# Patient Record
Sex: MALE | Race: WHITE
[De-identification: names, ages, dates, MRNs, and addresses within clinical notes are randomized per-mention and may not be internally consistent; named-entity substitution may affect disease eponyms.]

---

## 2018-12-26 ENCOUNTER — HOSPITAL ENCOUNTER (EMERGENCY)
Dept: HOSPITAL 65 - ER | Age: 63
Discharge: OTHER INSTITUTION | End: 2018-12-26
Payer: COMMERCIAL

## 2018-12-26 VITALS — BODY MASS INDEX: 21.82 KG/M2 | HEIGHT: 74 IN | WEIGHT: 170 LBS

## 2018-12-26 VITALS — DIASTOLIC BLOOD PRESSURE: 80 MMHG | SYSTOLIC BLOOD PRESSURE: 127 MMHG

## 2018-12-26 VITALS — SYSTOLIC BLOOD PRESSURE: 120 MMHG | DIASTOLIC BLOOD PRESSURE: 62 MMHG

## 2018-12-26 VITALS — SYSTOLIC BLOOD PRESSURE: 127 MMHG | DIASTOLIC BLOOD PRESSURE: 80 MMHG

## 2018-12-26 VITALS — DIASTOLIC BLOOD PRESSURE: 81 MMHG | SYSTOLIC BLOOD PRESSURE: 121 MMHG

## 2018-12-26 DIAGNOSIS — Z88.0: ICD-10-CM

## 2018-12-26 DIAGNOSIS — F17.210: ICD-10-CM

## 2018-12-26 DIAGNOSIS — R05: ICD-10-CM

## 2018-12-26 DIAGNOSIS — R06.02: Primary | ICD-10-CM

## 2018-12-26 LAB
ALP INTEST CFR SERPL: 104 U/L (ref 50–136)
ALT SERPL-CCNC: 53 U/L (ref 12–78)
AST SERPL-CCNC: 34 U/L (ref 0–35)
BASE EXCESS BLDA CALC-SCNC: -1.7 MMOL/L (ref -2–2)
BASOPHILS # BLD AUTO: 0 10^3/UL (ref 0–0.1)
BASOPHILS NFR BLD AUTO: 0.1 % (ref 0–0.2)
CALCIUM SERPL-MCNC: 8.8 MG/DL (ref 8.4–10.5)
CO2 BLDA-SCNC: 25.4 MMOL/L (ref 20–32)
EOSINOPHIL # BLD AUTO: 0 10^3/UL (ref 0–0.2)
EOSINOPHIL NFR BLD AUTO: 0 % (ref 0–5)
ERYTHROCYTE [DISTWIDTH] IN BLOOD BY AUTOMATED COUNT: 24.1 % (ref 11.5–14.5)
GLUCOSE PRE 100 G GLC PO SERPL-MCNC: 128 MG/DL (ref 70–110)
HCO3 BLDA-SCNC: 20.2 MMOL/L (ref 22–26)
HGB BLD-MCNC: 14.1 G/DL (ref 13.9–16.3)
LYMPHOCYTES # BLD AUTO: 0.8 10^3/UL (ref 1–4.8)
LYMPHOCYTES NFR BLD AUTO: 4.9 % (ref 24–44)
LYMPHOCYTES NFR BLD: 3 % (ref 25–36)
MANUAL DIF COMMENT BLD-IMP: NORMAL
MCH RBC QN AUTO: 28.8 PG (ref 26–34)
MCHC RBC AUTO-ENTMCNC: 31.5 G/DL (ref 33–37)
MCV RBC AUTO: 91.4 FL (ref 78–100)
MONOCYTES # BLD AUTO: 0.6 10^3/UL (ref 0.3–0.8)
MONOCYTES NFR BLD AUTO: 3.6 % (ref 5–12)
MONOCYTES NFR BLD: 2 % (ref 3–9)
NEUTROPHILS # BLD AUTO: 14.8 10^3/UL (ref 1.8–7.7)
NEUTROPHILS NFR BLD AUTO: 86.9 % (ref 41–85)
NEUTS SEG NFR BLD: 95 % (ref 31–76)
PCO2 BLDA: 27.6 MMHG (ref 35–45)
PH BLDA: 7.48 [PH] (ref 7.35–7.45)
PLATELET # BLD AUTO: 53 10^3/UL (ref 150–400)
WBC # BLD AUTO: 17 10^3/UL (ref 4.5–11)

## 2018-12-26 PROCEDURE — 96365 THER/PROPH/DIAG IV INF INIT: CPT

## 2018-12-26 PROCEDURE — 94640 AIRWAY INHALATION TREATMENT: CPT

## 2018-12-26 PROCEDURE — 99285 EMERGENCY DEPT VISIT HI MDM: CPT

## 2018-12-26 PROCEDURE — 85379 FIBRIN DEGRADATION QUANT: CPT

## 2018-12-26 PROCEDURE — 80053 COMPREHEN METABOLIC PANEL: CPT

## 2018-12-26 PROCEDURE — 71045 X-RAY EXAM CHEST 1 VIEW: CPT

## 2018-12-26 PROCEDURE — 36600 WITHDRAWAL OF ARTERIAL BLOOD: CPT

## 2018-12-26 PROCEDURE — 93005 ELECTROCARDIOGRAM TRACING: CPT

## 2018-12-26 PROCEDURE — 36415 COLL VENOUS BLD VENIPUNCTURE: CPT

## 2018-12-26 PROCEDURE — 96375 TX/PRO/DX INJ NEW DRUG ADDON: CPT

## 2018-12-26 PROCEDURE — 85025 COMPLETE CBC W/AUTO DIFF WBC: CPT

## 2018-12-26 PROCEDURE — 82803 BLOOD GASES ANY COMBINATION: CPT

## 2018-12-26 NOTE — ER.PDOC
General


Chief Complaint:  General Complaint


Stated Complaint:  POSSIBLE STROKE


Time seen by MD:  19:02


Source:  patient, EMS


Exam Limitations:  clinical condition





History of Present Illness


Initial Comments


pt awake alert but wont talk,sob x 8 days


Severity:  mild, moderate


Activities at Onset:  activity/exertion


Prior Episodes/Possible Cause:  unknown cause


Associated Symptoms:  denies symptoms


Prior symptoms/Treatment:  Similar symptoms previous


Allergies:  


Coded Allergies:  


     Penicillins (Verified  Allergy, Unknown, Shortness of Breath, 12/26/18)





Past Medical History


Medical History:  COPD


Surgical History:  no surgical history





Family History


Significant Family History:  no pertinent family hx





Social History


Smoking:  cigarettes





Review of Systems


Constitutional:  no symptoms reported


EENTM:  no symptoms reported


Respiratory:  no symptoms reported, see HPI, cough, shortness of breath


Cardiovascular:  no symptoms reported


Gastrointestinal:  no symptoms reported


Musculoskeletal:  no symptoms reported


Skin:  no symptoms reported


Psychiatric/Neurological:  no symptoms reported


Endocrine:  no symptoms reported


All Other Systems:  Reviewed and Negative





Physical Exam


General Appearance:  No Apparent Distress, WD/WN


HEENT:  PERRL/EOMI, Normal ENT Inspection


Neck:  Non-Tender


Respiratory:  chest non-tender, lungs clear, normal breath sounds, no 

respiratory distress


Cardiovascular:  Normal Peripheral Pulses


Gastrointestinal:  Normal Bowel Sounds


Extremities:  Normal Range of Motion


Neurologic/Psychiatric:  CNs II-XII NML as Tested, No Motor/Sensory Deficits, 

Alert, Other (wont talk)


Skin:  Normal Color


Lymphatic:  No Adenopathy





Results/Orders


Results/Orders





Laboratory Tests








Test


 12/26/18


19:14 12/26/18


19:32 12/26/18


19:38


 


Blood Gas Sample Site


 LEFT BRACHIAL


ARTRY 


 





 


Blood Gas pH


 7.483


(7.350-7.450) 


 





 


Blood Gas PCO2


 27.6 mmHg


(35.0-45.0) 


 





 


Blood Gas PO2


 126.7 mmHg


(75.0-100.0) 


 





 


Blood Gas HCO3


 20.2 mmol/L


(22.0-26.0) 


 





 


Blood Gas Base Excess


 -1.7 mmol/L


(-2.0-2.0) 


 





 


Vinod Test N/A   


 


Arterial Blood Oxygen


Saturation 98.4 % (95-) 


 


 





 


Deoxyhemoglobin


 1.6 %


(0.2-0.6) 


 





 


Carboxyhemoglobin


 1.7 %


(0.5-1.5) 


 





 


Methemoglobin


 0.3 %


(0.2-0.6) 


 





 


Total Hemoglobin


 15.0 %


(13.5-17.5) 


 





 


Total Oxygen Concentration


 20.5 %


(13.5-17.5) 


 





 


Lactic Acid (Blood Gas)


 3.0 MMOL/L


(0.5-1.0) 


 





 


Blood Gas Temperature 37   


 


Oxygen Delivery Method (LAB) HI CON   


 


FiO2


 100.0 %


() 


 





 


Bicarbonate


 21.1 mmol/L


(23-27) 


 





 


White Blood Count


 


 17.0 10^3/uL


(4.5-11.0) 





 


Red Blood Count


 


 4.89 10^6/uL


(4.50-5.90) 





 


Hemoglobin


 


 14.1 g/dL


(13.9-16.3) 





 


Hematocrit


 


 44.7 %


(37.0-53.0) 





 


Mean Corpuscular Volume


 


 91.4 fL


() 





 


Mean Corpuscular Hemoglobin


 


 28.8 pg


(26-34) 





 


Mean Corpuscular Hemoglobin


Concent 


 31.5 g/dL


(33-37) 





 


Red Cell Distribution Width


 


 24.1 %


(11.5-14.5) 





 


Platelet Count


 


 53 10^3/uL


(150-400) 





 


Neutrophils (%) (Auto)


 


 86.9 %


(41.0-85.0) 





 


Lymphocytes (%) (Auto)


 


 4.9 %


(24.0-44.0) 





 


Monocytes (%) (Auto)


 


 3.6 %


(5.0-12.0) 





 


Neutrophils # (Auto)


 


 14.8 10^3/uL


(1.8-7.7) 





 


Lymphocytes # (Auto)


 


 0.8 10^3/uL


(1.0-4.8) 





 


Monocytes # (Auto)


 


 0.6 10^3/uL


(0.3-0.8) 





 


Absolute Immature Granulocyte


(auto 


 0.76 10^3 u/L


(0-2) 





 


Eosinophils %


 


 0.0 %


(0.0-5.0) 





 


Basophils %


 


 0.1 %


(0.0-0.2) 





 


Basophils #


 


 0.0 10^3/uL


(0.0-0.1) 





 


Eosinophil Count


 


 0.0 10^3/uL


(0.0-0.2) 





 


D-Dimer


 


 > 35.20 mg/L


(0.19-0.49) 





 


Sodium Level


 


 142 mmol/L


(132-145) 





 


Potassium Level


 


 3.8 mmol/L


(3.6-5.2) 





 


Chloride Level


 


 104.0 mmol/L


() 





 


Carbon Dioxide Level


 


 25.4 mmol/L


(20.0-32) 





 


Anion Gap  16.4  


 


Blood Urea Nitrogen


 


 62 mg/dL


(7-18) 





 


Creatinine


 


 1.73 mg/dL


(0.59-1.40) 





 


Estimated GFR (


American) 


 48.4 (>/=60) 


 





 


BUN/Creatinine Ratio  35.0  


 


Glucose Level


 


 128 mg/dL


() 





 


Calcium Level


 


 8.8 mg/dL


(8.4-10.5) 





 


Total Bilirubin


 


 3.8 mg/dL


(0.2-1.0) 





 


Aspartate Amino Transf


(AST/SGOT) 


 34 U/L (0-35) 


 





 


Alanine Aminotransferase


(ALT/SGPT) 


 53 U/L (12-78) 


 





 


Alkaline Phosphatase


 


 104 U/L


() 





 


Total Protein


 


 8.4 g/dL


(6.4-8.2) 





 


Albumin


 


 2.1 g/dL


(3.4-5.0) 





 


Globulin  6.3  


 


Percent Immature Gran (Cell


Imm) 


 4.50 %


(0.00-0.50) 





 


Differential Total Cells


Counted 


 


 100 #CELLS 





 


Segmented Neutrophils   95 % (31-76) 


 


Lymphocytes   3 % (25-36) 


 


Monocytes   2 % (3-9) 


 


Differential Comment   NORMAL 


 


Platelet Estimate   DECREASED 


 


Platelet Morphology   NORMAL 


 


Blood Morphology Comment


 


 


 NORMAL


MORPHOLOGY








Administered Medications








 Medications


  (Trade)  Dose


 Ordered  Sig/Destinee


 Route


 PRN Reason  Start Time


 Stop Time Status Last Admin


Dose Admin


 


 Methylprednisolone


 Sodium Succinate


  (Solu-Medrol)  125 mg  STAT  STAT


 IV


   12/26/18 19:02


 12/26/18 19:10 DC 12/26/18 19:12





 


 Ceftriaxone


 Sodium 1000 mg/


 Sodium Chloride  100 ml @ 


 100 mls/hr  STAT  ONCE


 IV


   12/26/18 19:30


 12/26/18 20:29  12/26/18 19:13





 


 Levalbuterol HCl


  (Xopenex)  0.63 mg  STAT  STAT


 IH


   12/26/18 19:37


 12/26/18 19:38 DC 12/26/18 19:41














Departure


Time of Disposition:  20:17


Disposition:  70 DISC/XFER TO ANOTH TYP [facility]


Impression:  


 Primary Impression:  


 Dyspnea


Condition:  Stable


Duration or Time Spent with Pa:  30











ROSE BOO MD Dec 26, 2018 19:11

## 2018-12-26 NOTE — NUR
TRANSFER



PATIENT ACCEPTED AT [facility] ER BY DR BONE

PATIENT NOTIFIED, AGREED TO TRANSFER

## 2018-12-26 NOTE — DIREP
PROCEDURE:CHEST 1 VIEW

 

COMPARISON:None.

 

INDICATIONS:DIMINISHED BREATH SOUNDS

 

FINDINGS:

LUNGS/PLEURA:Possible medial left basilar infiltrate.  No other significant 

pulmonary parenchymal abnormalities. No effusions.

VASCULATURE:Mild cardiomegaly.

CARDIAC:Normal.  No cardiac silhouette abnormality or cardiomegaly. 

MEDIASTINUM:Soft tissue prominence in the left superior mediastinum, aortic 

aneurysm or mediastinal mass possible.

BONES:Normal.  No fracture or visible bony lesion. 

OTHER:Negative.  

 

CONCLUSION:Possible medial left basilar infiltrate.  Mild cardiomegaly.  Soft 

tissue prominence in the left superior mediastinum, aortic aneurysm or 

mediastinal mass possible.

 

 

 

Dictated by: Devaughn Roberts M.D. on 12/26/2018 at 07:43 PM     

Electronically Signed By: Devaughn Roberts M.D. on 12/26/2018 at 07:46 PM

## 2018-12-26 NOTE — NUR
EMS



AT BEDSIDE TO TAKE PATIENT TO [facility] ER VIA AMBULANCE.

REPORT GIVEN TO CALVIN

## 2018-12-27 NOTE — PCM.EKG
[facility]

                                       

Test Date:    2018               Test Time:    19:10:23

Pat Name:     VIVIEN VILLALOBOS           Department:   

Patient ID:   [id number]          Room:          

Gender:       M                        Technician:   JOHNATHON

:          1955               Requested By: ROSE BOO

Order Number: [id number]           Reading MD:     

                                 Measurements

Intervals                              Axis          

Rate:         146                      P:            

RI:                                    QRS:          -13

QRSD:         120                      T:            221

QT:           346                                    

QTc:          539                                    

                           Interpretive Statements

Wide QRS tachycardia

Left ventricular hypertrophy with QRS widening and repolarization abnormality

Inferior infarct, age undetermined

Abnormal ECG

No previous ECG available for comparison



Please click the below link to view image of tracing.